# Patient Record
Sex: FEMALE | Race: WHITE | HISPANIC OR LATINO | URBAN - METROPOLITAN AREA
[De-identification: names, ages, dates, MRNs, and addresses within clinical notes are randomized per-mention and may not be internally consistent; named-entity substitution may affect disease eponyms.]

---

## 2017-05-04 ENCOUNTER — GENERIC CONVERSION - ENCOUNTER (OUTPATIENT)
Dept: OTHER | Facility: OTHER | Age: 67
End: 2017-05-04

## 2017-07-18 ENCOUNTER — ALLSCRIPTS OFFICE VISIT (OUTPATIENT)
Dept: OTHER | Facility: OTHER | Age: 67
End: 2017-07-18

## 2017-07-18 DIAGNOSIS — Z12.39 ENCOUNTER FOR OTHER SCREENING FOR MALIGNANT NEOPLASM OF BREAST: ICD-10-CM

## 2017-07-21 ENCOUNTER — ALLSCRIPTS OFFICE VISIT (OUTPATIENT)
Dept: OTHER | Facility: OTHER | Age: 67
End: 2017-07-21

## 2017-08-09 ENCOUNTER — ALLSCRIPTS OFFICE VISIT (OUTPATIENT)
Dept: OTHER | Facility: OTHER | Age: 67
End: 2017-08-09

## 2017-08-30 ENCOUNTER — GENERIC CONVERSION - ENCOUNTER (OUTPATIENT)
Dept: OTHER | Facility: OTHER | Age: 67
End: 2017-08-30

## 2018-01-14 VITALS
HEIGHT: 60 IN | WEIGHT: 148.15 LBS | BODY MASS INDEX: 29.09 KG/M2 | TEMPERATURE: 97.8 F | SYSTOLIC BLOOD PRESSURE: 110 MMHG | DIASTOLIC BLOOD PRESSURE: 68 MMHG | HEART RATE: 78 BPM

## 2018-01-14 VITALS
DIASTOLIC BLOOD PRESSURE: 70 MMHG | BODY MASS INDEX: 29.25 KG/M2 | HEART RATE: 60 BPM | OXYGEN SATURATION: 98 % | SYSTOLIC BLOOD PRESSURE: 132 MMHG | HEIGHT: 60 IN | WEIGHT: 149 LBS

## 2018-01-14 VITALS
HEART RATE: 68 BPM | DIASTOLIC BLOOD PRESSURE: 70 MMHG | SYSTOLIC BLOOD PRESSURE: 110 MMHG | HEIGHT: 60 IN | TEMPERATURE: 97.7 F | WEIGHT: 148.15 LBS | BODY MASS INDEX: 29.09 KG/M2

## 2018-01-16 NOTE — PROGRESS NOTES
Recorded as Task  Date: 08/29/2017 02:18 PM, Created By: Arin Herring  Task Name: Follow Up  Assigned To: Johnathon Mcintyre  Regarding Patient: Tony Nguyen, Status: In Progress   Comment:   Hortencia Blue - 29 Aug 2017 2:18 PM    TASK CREATED  please sched Colon on 11/16/17 for this patient she needs a dm slot  Janet Black - 29 Aug 2017 3:20 PM    TASK IN PROGRESS  Jj Yadav - 29 Aug 2017 3:21 PM    TASK  EDITED  COLON scheduled with Dr Flanneyr on 11/16/17 at 11:30am   Arin Herring - 30 Aug 2017 10:30 AM    TASK REASSIGNED: Previously Assigned To Arin Herring  I spoke with the patient's daughter, this patient left to [place] and will  not be returning, please cancel this procedure thank you  Jj Yadav - 30 Aug 2017 12:18 PM    TASK EDITED  canceled    Arin Herring - 30 Aug 2017 12:50 PM    TASK REASSIGNED: Previously Assigned To Arin Herring  I just wanted to  make you aware